# Patient Record
Sex: MALE | Race: WHITE | NOT HISPANIC OR LATINO
[De-identification: names, ages, dates, MRNs, and addresses within clinical notes are randomized per-mention and may not be internally consistent; named-entity substitution may affect disease eponyms.]

---

## 2017-06-21 ENCOUNTER — HOSPITAL (OUTPATIENT)
Dept: OTHER | Age: 57
End: 2017-06-21
Attending: INTERNAL MEDICINE

## 2017-06-22 ENCOUNTER — PRIOR ORIGINAL RECORDS (OUTPATIENT)
Dept: OTHER | Age: 57
End: 2017-06-22

## 2017-06-22 ENCOUNTER — HOSPITAL (OUTPATIENT)
Dept: OTHER | Age: 57
End: 2017-06-22
Attending: INTERNAL MEDICINE

## 2017-06-22 LAB
ALBUMIN SERPL-MCNC: 3.5 GM/DL (ref 3.6–5.1)
ALBUMIN/GLOB SERPL: 1.1 {RATIO} (ref 1–2.4)
ALP SERPL-CCNC: 47 UNIT/L (ref 45–117)
ALT SERPL-CCNC: 20 UNIT/L
ANALYZER ANC (IANC): ABNORMAL
ANION GAP SERPL CALC-SCNC: 12 MMOL/L (ref 10–20)
AST SERPL-CCNC: 14 UNIT/L
BILIRUB SERPL-MCNC: 0.5 MG/DL (ref 0.2–1)
BUN SERPL-MCNC: 16 MG/DL (ref 6–20)
BUN/CREAT SERPL: 15 (ref 7–25)
CALCIUM SERPL-MCNC: 8.9 MG/DL (ref 8.4–10.2)
CHLORIDE: 105 MMOL/L (ref 98–107)
CHOLEST SERPL-MCNC: 140 MG/DL
CHOLEST/HDLC SERPL: 2.3 {RATIO}
CO2 SERPL-SCNC: 29 MMOL/L (ref 21–32)
CREAT SERPL-MCNC: 1.04 MG/DL (ref 0.67–1.17)
ERYTHROCYTE [DISTWIDTH] IN BLOOD: 12.6 % (ref 11–15)
GLOBULIN SER-MCNC: 3.2 GM/DL (ref 2–4)
GLUCOSE SERPL-MCNC: 98 MG/DL (ref 65–99)
HDLC SERPL-MCNC: 61 MG/DL
HEMATOCRIT: 40.7 % (ref 39–51)
HGB BLD-MCNC: 14 GM/DL (ref 13–17)
LDLC SERPL CALC-MCNC: 73 MG/DL
LENGTH OF FAST TIME PATIENT: ABNORMAL HR
LENGTH OF FAST TIME PATIENT: NORMAL HR
MCH RBC QN AUTO: 31.6 PG (ref 26–34)
MCHC RBC AUTO-ENTMCNC: 34.4 GM/DL (ref 32–36.5)
MCV RBC AUTO: 91.9 FL (ref 78–100)
NONHDLC SERPL-MCNC: 79 MG/DL
PLATELET # BLD: 255 THOUSAND/MCL (ref 140–450)
POTASSIUM SERPL-SCNC: 4.4 MMOL/L (ref 3.4–5.1)
PROT SERPL-MCNC: 6.7 GM/DL (ref 6.4–8.2)
RBC # BLD: 4.43 MILLION/MCL (ref 4.5–5.9)
SODIUM SERPL-SCNC: 142 MMOL/L (ref 135–145)
TRIGLYCERIDE (TRIGP): 30 MG/DL
TSH SERPL-ACNC: 2.12 MCUNIT/ML (ref 0.35–5)
WBC # BLD: 4.3 THOUSAND/MCL (ref 4.2–11)

## 2017-06-23 ENCOUNTER — PRIOR ORIGINAL RECORDS (OUTPATIENT)
Dept: OTHER | Age: 57
End: 2017-06-23

## 2017-06-23 ENCOUNTER — MYAURORA ACCOUNT LINK (OUTPATIENT)
Dept: OTHER | Age: 57
End: 2017-06-23

## 2017-06-23 LAB
ALBUMIN: 3.5 G/DL
ALKALINE PHOSPHATATE(ALK PHOS): 47 IU/L
ALT (SGPT): 20 U/L
AST (SGOT): 14 U/L
BILIRUBIN TOTAL: 0.5 MG/DL
BUN: 16 MG/DL
CALCIUM: 8.9 MG/DL
CHLORIDE: 105 MEQ/L
CHOLESTEROL, TOTAL: 140 MG/DL
CREATININE, SERUM: 1.04 MG/DL
GLOBULIN: 3.2 G/DL
GLUCOSE: 98 MG/DL
GLUCOSE: 98 MG/DL
HDL CHOLESTEROL: 61 MG/DL
HEMATOCRIT: 40.7 %
HEMOGLOBIN: 14 G/DL
LDL CHOLESTEROL: 73 MG/DL
MCH: 31.6 PG
MCHC: 34.4 G/DL
MCV: 91.9 FL
PLATELETS: 255 K/UL
POTASSIUM, SERUM: 4.4 MEQ/L
PROTEIN, TOTAL: 6.7 G/DL
RED BLOOD COUNT: 4.43 X 10-6/U
SGOT (AST): 14 IU/L
SGPT (ALT): 20 IU/L
SODIUM: 142 MEQ/L
THYROID STIMULATING HORMONE: 2.12 MLU/L
TRIGLYCERIDES: 30 MG/DL
WHITE BLOOD COUNT: 4.3 X 10-3/U

## 2017-06-26 ENCOUNTER — PRIOR ORIGINAL RECORDS (OUTPATIENT)
Dept: OTHER | Age: 57
End: 2017-06-26

## 2017-07-14 ENCOUNTER — PRIOR ORIGINAL RECORDS (OUTPATIENT)
Dept: OTHER | Age: 57
End: 2017-07-14

## 2017-08-22 ENCOUNTER — CHARTING TRANS (OUTPATIENT)
Dept: OTHER | Age: 57
End: 2017-08-22

## 2017-08-25 ENCOUNTER — CHARTING TRANS (OUTPATIENT)
Dept: OTHER | Age: 57
End: 2017-08-25

## 2018-05-07 ENCOUNTER — IMAGING SERVICES (OUTPATIENT)
Dept: OTHER | Age: 58
End: 2018-05-07

## 2018-05-07 ENCOUNTER — HOSPITAL (OUTPATIENT)
Dept: OTHER | Age: 58
End: 2018-05-07
Attending: EMERGENCY MEDICINE

## 2018-05-16 PROBLEM — S90.30XA CONTUSION OF FOOT, UNSPECIFIED LATERALITY, INITIAL ENCOUNTER: Status: ACTIVE | Noted: 2018-05-16

## 2018-07-09 ENCOUNTER — HOSPITAL (OUTPATIENT)
Dept: OTHER | Age: 58
End: 2018-07-09
Attending: INTERNAL MEDICINE

## 2018-07-09 ENCOUNTER — PRIOR ORIGINAL RECORDS (OUTPATIENT)
Dept: OTHER | Age: 58
End: 2018-07-09

## 2018-07-09 LAB
ALBUMIN SERPL-MCNC: 3.8 GM/DL (ref 3.6–5.1)
ALBUMIN/GLOB SERPL: 1.3 {RATIO} (ref 1–2.4)
ALP SERPL-CCNC: 56 UNIT/L (ref 45–117)
ALT SERPL-CCNC: 25 UNIT/L
ANALYZER ANC (IANC): ABNORMAL
ANION GAP SERPL CALC-SCNC: 6 MMOL/L (ref 10–20)
AST SERPL-CCNC: 16 UNIT/L
BILIRUB SERPL-MCNC: 0.6 MG/DL (ref 0.2–1)
BUN SERPL-MCNC: 13 MG/DL (ref 6–20)
BUN/CREAT SERPL: 12 (ref 7–25)
CALCIUM SERPL-MCNC: 8.8 MG/DL (ref 8.4–10.2)
CHLORIDE: 103 MMOL/L (ref 98–107)
CO2 SERPL-SCNC: 32 MMOL/L (ref 21–32)
CREAT SERPL-MCNC: 1.07 MG/DL (ref 0.67–1.17)
ERYTHROCYTE [DISTWIDTH] IN BLOOD: 13.1 % (ref 11–15)
GLOBULIN SER-MCNC: 2.9 GM/DL (ref 2–4)
GLUCOSE SERPL-MCNC: 97 MG/DL (ref 65–99)
HEMATOCRIT: 40 % (ref 39–51)
HGB BLD-MCNC: 14.1 GM/DL (ref 13–17)
LENGTH OF FAST TIME PATIENT: ABNORMAL HR
MCH RBC QN AUTO: 32.6 PG (ref 26–34)
MCHC RBC AUTO-ENTMCNC: 35.3 GM/DL (ref 32–36.5)
MCV RBC AUTO: 92.4 FL (ref 78–100)
NRBC (NRBCRE): ABNORMAL
NT-PROBNP SERPL-MCNC: 62 PG/ML
PLATELET # BLD: 258 THOUSAND/MCL (ref 140–450)
POTASSIUM SERPL-SCNC: 4.4 MMOL/L (ref 3.4–5.1)
PROT SERPL-MCNC: 6.7 GM/DL (ref 6.4–8.2)
RBC # BLD: 4.33 MILLION/MCL (ref 4.5–5.9)
SODIUM SERPL-SCNC: 137 MMOL/L (ref 135–145)
WBC # BLD: 5.5 THOUSAND/MCL (ref 4.2–11)

## 2018-07-10 LAB
ALBUMIN: 3.8 G/DL
ALKALINE PHOSPHATATE(ALK PHOS): 56 IU/L
BILIRUBIN TOTAL: 0.6 MG/DL
BUN: 13 MG/DL
CALCIUM: 8.8 MG/DL
CHLORIDE: 103 MEQ/L
CREATININE, SERUM: 1.07 MG/DL
GLOBULIN: 2.9 G/DL
GLUCOSE: 97 MG/DL
HEMATOCRIT: 40 %
HEMOGLOBIN: 14.1 G/DL
MCH: 32.6 PG
MCHC: 35.3 G/DL
MCV: 92.4 FL
PLATELETS: 258 K/UL
POTASSIUM, SERUM: 4.4 MEQ/L
PROTEIN, TOTAL: 6.7 G/DL
RED BLOOD COUNT: 4.33 X 10-6/U
SGOT (AST): 16 IU/L
SGPT (ALT): 25 IU/L
SODIUM: 137 MEQ/L
WHITE BLOOD COUNT: 5.5 X 10-3/U

## 2018-07-12 ENCOUNTER — CHARTING TRANS (OUTPATIENT)
Dept: OTHER | Age: 58
End: 2018-07-12

## 2018-07-12 ASSESSMENT — PAIN SCALES - GENERAL: PAINLEVEL_OUTOF10: 8

## 2018-07-16 ENCOUNTER — PRIOR ORIGINAL RECORDS (OUTPATIENT)
Dept: OTHER | Age: 58
End: 2018-07-16

## 2018-07-20 ENCOUNTER — PRIOR ORIGINAL RECORDS (OUTPATIENT)
Dept: OTHER | Age: 58
End: 2018-07-20

## 2018-07-27 ENCOUNTER — PRIOR ORIGINAL RECORDS (OUTPATIENT)
Dept: OTHER | Age: 58
End: 2018-07-27

## 2018-07-27 ENCOUNTER — MYAURORA ACCOUNT LINK (OUTPATIENT)
Dept: OTHER | Age: 58
End: 2018-07-27

## 2018-07-31 ENCOUNTER — PRIOR ORIGINAL RECORDS (OUTPATIENT)
Dept: OTHER | Age: 58
End: 2018-07-31

## 2018-08-03 ENCOUNTER — MYAURORA ACCOUNT LINK (OUTPATIENT)
Dept: OTHER | Age: 58
End: 2018-08-03

## 2018-08-03 ENCOUNTER — PRIOR ORIGINAL RECORDS (OUTPATIENT)
Dept: OTHER | Age: 58
End: 2018-08-03

## 2018-08-06 ENCOUNTER — PRIOR ORIGINAL RECORDS (OUTPATIENT)
Dept: OTHER | Age: 58
End: 2018-08-06

## 2018-10-16 ENCOUNTER — HOSPITAL (OUTPATIENT)
Dept: OTHER | Age: 58
End: 2018-10-16
Attending: EMERGENCY MEDICINE

## 2018-10-31 VITALS
TEMPERATURE: 96.6 F | OXYGEN SATURATION: 98 % | WEIGHT: 175.04 LBS | RESPIRATION RATE: 14 BRPM | BODY MASS INDEX: 24.51 KG/M2 | HEIGHT: 71 IN | HEART RATE: 71 BPM

## 2018-11-03 VITALS
SYSTOLIC BLOOD PRESSURE: 112 MMHG | HEART RATE: 74 BPM | DIASTOLIC BLOOD PRESSURE: 88 MMHG | TEMPERATURE: 98.5 F | RESPIRATION RATE: 14 BRPM | BODY MASS INDEX: 23.8 KG/M2 | HEIGHT: 71 IN | WEIGHT: 170 LBS

## 2018-12-01 ENCOUNTER — PRIOR ORIGINAL RECORDS (OUTPATIENT)
Dept: HEALTH INFORMATION MANAGEMENT | Facility: OTHER | Age: 58
End: 2018-12-01

## 2019-01-01 ENCOUNTER — EXTERNAL RECORD (OUTPATIENT)
Dept: HEALTH INFORMATION MANAGEMENT | Facility: OTHER | Age: 59
End: 2019-01-01

## 2019-01-02 ENCOUNTER — PRIOR ORIGINAL RECORDS (OUTPATIENT)
Dept: OTHER | Age: 59
End: 2019-01-02

## 2019-01-02 ENCOUNTER — MYAURORA ACCOUNT LINK (OUTPATIENT)
Dept: OTHER | Age: 59
End: 2019-01-02

## 2019-01-29 ENCOUNTER — TELEPHONE (OUTPATIENT)
Dept: SCHEDULING | Age: 59
End: 2019-01-29

## 2019-02-28 VITALS
HEIGHT: 71 IN | RESPIRATION RATE: 20 BRPM | WEIGHT: 174 LBS | OXYGEN SATURATION: 97 % | HEART RATE: 60 BPM | DIASTOLIC BLOOD PRESSURE: 100 MMHG | SYSTOLIC BLOOD PRESSURE: 150 MMHG | BODY MASS INDEX: 24.36 KG/M2

## 2019-02-28 VITALS
DIASTOLIC BLOOD PRESSURE: 60 MMHG | HEIGHT: 71 IN | RESPIRATION RATE: 20 BRPM | BODY MASS INDEX: 24.22 KG/M2 | HEART RATE: 60 BPM | WEIGHT: 173 LBS | SYSTOLIC BLOOD PRESSURE: 98 MMHG

## 2019-02-28 VITALS
SYSTOLIC BLOOD PRESSURE: 122 MMHG | RESPIRATION RATE: 16 BRPM | HEIGHT: 71 IN | HEART RATE: 77 BPM | DIASTOLIC BLOOD PRESSURE: 86 MMHG | WEIGHT: 174 LBS | BODY MASS INDEX: 24.36 KG/M2

## 2019-02-28 VITALS
BODY MASS INDEX: 23.8 KG/M2 | HEART RATE: 72 BPM | DIASTOLIC BLOOD PRESSURE: 82 MMHG | WEIGHT: 170 LBS | HEIGHT: 71 IN | SYSTOLIC BLOOD PRESSURE: 134 MMHG

## 2019-03-30 ENCOUNTER — WALK IN (OUTPATIENT)
Dept: URGENT CARE | Age: 59
End: 2019-03-30

## 2019-03-30 DIAGNOSIS — H61.23 BILATERAL IMPACTED CERUMEN: Primary | ICD-10-CM

## 2019-03-30 PROCEDURE — 69209 REMOVE IMPACTED EAR WAX UNI: CPT | Performed by: NURSE PRACTITIONER

## 2019-03-30 PROCEDURE — 99213 OFFICE O/P EST LOW 20 MIN: CPT | Performed by: NURSE PRACTITIONER

## 2019-03-30 RX ORDER — LISINOPRIL 5 MG/1
5 TABLET ORAL
COMMUNITY
Start: 2018-08-06

## 2019-03-30 RX ORDER — METOPROLOL SUCCINATE 25 MG/1
TABLET, EXTENDED RELEASE ORAL DAILY
COMMUNITY
Start: 2018-08-08 | End: 2020-03-14 | Stop reason: SDUPTHER

## 2019-03-30 ASSESSMENT — ENCOUNTER SYMPTOMS
TROUBLE SWALLOWING: 0
DIZZINESS: 0
EYE ITCHING: 0
FEVER: 0
SHORTNESS OF BREATH: 0
SINUS PAIN: 0
CHEST TIGHTNESS: 0
PHOTOPHOBIA: 0
EYE DISCHARGE: 0
EYE PAIN: 0
SINUS PRESSURE: 0
COUGH: 0
HEADACHES: 0
FATIGUE: 0
WHEEZING: 0
CHILLS: 0
LIGHT-HEADEDNESS: 0
SORE THROAT: 0
RHINORRHEA: 0
EYE REDNESS: 0

## 2019-03-30 ASSESSMENT — PAIN SCALES - GENERAL: PAINLEVEL: 7-8

## 2019-06-07 ENCOUNTER — TELEPHONE (OUTPATIENT)
Dept: SCHEDULING | Age: 59
End: 2019-06-07

## 2019-08-30 ENCOUNTER — WALK IN (OUTPATIENT)
Dept: URGENT CARE | Age: 59
End: 2019-08-30

## 2019-08-30 DIAGNOSIS — L23.7 POISON IVY DERMATITIS: Primary | ICD-10-CM

## 2019-08-30 PROCEDURE — 99203 OFFICE O/P NEW LOW 30 MIN: CPT | Performed by: NURSE PRACTITIONER

## 2019-08-30 RX ORDER — TRIAMCINOLONE ACETONIDE 1 MG/G
OINTMENT TOPICAL 2 TIMES DAILY
Qty: 30 G | Refills: 0 | Status: SHIPPED | OUTPATIENT
Start: 2019-08-30 | End: 2020-03-14 | Stop reason: ALTCHOICE

## 2019-08-30 RX ORDER — PREDNISONE 10 MG/1
TABLET ORAL
Qty: 63 TABLET | Refills: 0 | Status: SHIPPED | OUTPATIENT
Start: 2019-08-30 | End: 2020-03-14 | Stop reason: ALTCHOICE

## 2019-08-30 RX ORDER — METOPROLOL SUCCINATE 25 MG/1
25 TABLET, EXTENDED RELEASE ORAL DAILY
COMMUNITY

## 2019-08-30 RX ORDER — LISINOPRIL 5 MG/1
5 TABLET ORAL DAILY
COMMUNITY
End: 2020-03-14 | Stop reason: SDUPTHER

## 2019-08-30 ASSESSMENT — ENCOUNTER SYMPTOMS
FATIGUE: 0
LIGHT-HEADEDNESS: 0
COUGH: 0
SHORTNESS OF BREATH: 0
WHEEZING: 0
CHEST TIGHTNESS: 0
FEVER: 0
CHILLS: 0
DIZZINESS: 0
HEADACHES: 0

## 2019-08-30 ASSESSMENT — PAIN SCALES - GENERAL: PAINLEVEL: 0

## 2020-01-01 ENCOUNTER — EXTERNAL RECORD (OUTPATIENT)
Dept: HEALTH INFORMATION MANAGEMENT | Facility: OTHER | Age: 60
End: 2020-01-01

## 2020-03-14 ENCOUNTER — WALK IN (OUTPATIENT)
Dept: URGENT CARE | Age: 60
End: 2020-03-14

## 2020-03-14 VITALS
SYSTOLIC BLOOD PRESSURE: 118 MMHG | RESPIRATION RATE: 16 BRPM | DIASTOLIC BLOOD PRESSURE: 68 MMHG | HEART RATE: 62 BPM | HEIGHT: 71 IN | WEIGHT: 165 LBS | BODY MASS INDEX: 23.1 KG/M2 | TEMPERATURE: 98.1 F

## 2020-03-14 DIAGNOSIS — H61.23 BILATERAL IMPACTED CERUMEN: Primary | ICD-10-CM

## 2020-03-14 PROCEDURE — 69210 REMOVE IMPACTED EAR WAX UNI: CPT | Performed by: NURSE PRACTITIONER

## 2020-03-14 PROCEDURE — 99213 OFFICE O/P EST LOW 20 MIN: CPT | Performed by: NURSE PRACTITIONER

## 2020-03-14 ASSESSMENT — ENCOUNTER SYMPTOMS
VOMITING: 0
DIARRHEA: 0
ABDOMINAL PAIN: 0
RHINORRHEA: 0
COUGH: 0
HEADACHES: 0
SORE THROAT: 0

## 2020-04-16 ENCOUNTER — TELEPHONE (OUTPATIENT)
Dept: SCHEDULING | Age: 60
End: 2020-04-16

## 2020-06-05 ENCOUNTER — TELEPHONE (OUTPATIENT)
Dept: SCHEDULING | Age: 60
End: 2020-06-05

## 2020-09-30 ENCOUNTER — TELEPHONE (OUTPATIENT)
Dept: SCHEDULING | Age: 60
End: 2020-09-30

## 2020-10-01 ENCOUNTER — WALK IN (OUTPATIENT)
Dept: URGENT CARE | Age: 60
End: 2020-10-01
Attending: EMERGENCY MEDICINE

## 2020-10-01 DIAGNOSIS — J32.9 BACTERIAL SINUSITIS: Primary | ICD-10-CM

## 2020-10-01 DIAGNOSIS — B96.89 BACTERIAL SINUSITIS: Primary | ICD-10-CM

## 2020-10-01 PROCEDURE — 99212 OFFICE O/P EST SF 10 MIN: CPT

## 2020-10-01 RX ORDER — SULFAMETHOXAZOLE AND TRIMETHOPRIM 800; 160 MG/1; MG/1
1 TABLET ORAL 2 TIMES DAILY
Qty: 14 TABLET | Refills: 0 | Status: SHIPPED | OUTPATIENT
Start: 2020-10-01 | End: 2020-10-08

## 2020-10-01 ASSESSMENT — ENCOUNTER SYMPTOMS
FEVER: 0
RESPIRATORY NEGATIVE: 1
CHILLS: 0
SINUS PRESSURE: 1
NEUROLOGICAL NEGATIVE: 1
GASTROINTESTINAL NEGATIVE: 1
SINUS PAIN: 1

## 2020-10-01 ASSESSMENT — PAIN SCALES - GENERAL: PAINLEVEL: 0

## 2021-01-01 ENCOUNTER — EXTERNAL RECORD (OUTPATIENT)
Dept: HEALTH INFORMATION MANAGEMENT | Facility: OTHER | Age: 61
End: 2021-01-01

## 2021-02-19 ENCOUNTER — TELEPHONE (OUTPATIENT)
Dept: SCHEDULING | Age: 61
End: 2021-02-19

## 2021-02-20 ENCOUNTER — OFFICE VISIT (OUTPATIENT)
Dept: URGENT CARE | Age: 61
End: 2021-02-20

## 2021-02-20 DIAGNOSIS — H61.21 IMPACTED CERUMEN OF RIGHT EAR: Primary | ICD-10-CM

## 2021-02-20 PROCEDURE — 99213 OFFICE O/P EST LOW 20 MIN: CPT | Performed by: NURSE PRACTITIONER

## 2021-02-20 PROCEDURE — 69210 REMOVE IMPACTED EAR WAX UNI: CPT | Performed by: NURSE PRACTITIONER

## 2021-02-20 ASSESSMENT — ENCOUNTER SYMPTOMS
CHOKING: 0
FACIAL SWELLING: 0
SINUS PAIN: 0
DIARRHEA: 0
NAUSEA: 0
DIZZINESS: 0
COLOR CHANGE: 0
EYE REDNESS: 0
VOMITING: 0
EYE PAIN: 0
FEVER: 0
SHORTNESS OF BREATH: 0
PHOTOPHOBIA: 0
ABDOMINAL PAIN: 0
WHEEZING: 0
SORE THROAT: 0
DIAPHORESIS: 0
RHINORRHEA: 0
WEAKNESS: 0
COUGH: 0
STRIDOR: 0
APPETITE CHANGE: 0
APNEA: 0
BACK PAIN: 0
TROUBLE SWALLOWING: 0
HEADACHES: 0
SINUS PRESSURE: 0
LIGHT-HEADEDNESS: 0
CHEST TIGHTNESS: 0
EYE ITCHING: 0
CHILLS: 0
ACTIVITY CHANGE: 0
FATIGUE: 0
EYE DISCHARGE: 0

## 2021-02-20 ASSESSMENT — PAIN SCALES - GENERAL: PAINLEVEL: 0

## 2021-03-23 ENCOUNTER — IMMUNIZATION (OUTPATIENT)
Dept: LAB | Age: 61
End: 2021-03-23

## 2021-03-23 DIAGNOSIS — Z23 NEED FOR VACCINATION: Primary | ICD-10-CM

## 2021-03-23 PROCEDURE — 0001A COVID 19 PFIZER-BIONTECH: CPT | Performed by: NURSE PRACTITIONER

## 2021-03-23 PROCEDURE — 91300 COVID 19 PFIZER-BIONTECH: CPT | Performed by: NURSE PRACTITIONER

## 2021-04-13 ENCOUNTER — IMMUNIZATION (OUTPATIENT)
Dept: LAB | Age: 61
End: 2021-04-13
Attending: HOSPITALIST

## 2021-04-13 DIAGNOSIS — Z23 NEED FOR VACCINATION: Primary | ICD-10-CM

## 2021-04-13 PROCEDURE — 0002A COVID 19 PFIZER-BIONTECH: CPT

## 2021-04-13 PROCEDURE — 91300 COVID 19 PFIZER-BIONTECH: CPT

## 2021-04-16 ENCOUNTER — IMMUNIZATION (OUTPATIENT)
Dept: URGENT CARE | Age: 61
End: 2021-04-16

## 2021-04-16 VITALS
SYSTOLIC BLOOD PRESSURE: 100 MMHG | DIASTOLIC BLOOD PRESSURE: 60 MMHG | HEART RATE: 66 BPM | RESPIRATION RATE: 18 BRPM | TEMPERATURE: 98.5 F

## 2021-04-16 DIAGNOSIS — H61.23 BILATERAL IMPACTED CERUMEN: ICD-10-CM

## 2021-04-16 DIAGNOSIS — H91.90 PERCEIVED HEARING CHANGES: Primary | ICD-10-CM

## 2021-04-16 PROCEDURE — 69209 REMOVE IMPACTED EAR WAX UNI: CPT | Performed by: NURSE PRACTITIONER

## 2021-04-16 PROCEDURE — 99212 OFFICE O/P EST SF 10 MIN: CPT | Performed by: NURSE PRACTITIONER

## 2021-04-16 ASSESSMENT — ENCOUNTER SYMPTOMS
FEVER: 0
DIZZINESS: 0

## 2021-05-26 VITALS
RESPIRATION RATE: 16 BRPM | WEIGHT: 170 LBS | HEART RATE: 72 BPM | HEIGHT: 71 IN | OXYGEN SATURATION: 95 % | TEMPERATURE: 98 F | BODY MASS INDEX: 24.08 KG/M2 | BODY MASS INDEX: 22.68 KG/M2 | RESPIRATION RATE: 16 BRPM | SYSTOLIC BLOOD PRESSURE: 134 MMHG | OXYGEN SATURATION: 99 % | RESPIRATION RATE: 14 BRPM | DIASTOLIC BLOOD PRESSURE: 68 MMHG | OXYGEN SATURATION: 98 % | BODY MASS INDEX: 23.8 KG/M2 | WEIGHT: 172 LBS | TEMPERATURE: 97.8 F | TEMPERATURE: 98.1 F | WEIGHT: 166 LBS | HEIGHT: 71 IN | RESPIRATION RATE: 18 BRPM | DIASTOLIC BLOOD PRESSURE: 88 MMHG | HEART RATE: 93 BPM | TEMPERATURE: 98.1 F | BODY MASS INDEX: 23.15 KG/M2 | HEART RATE: 74 BPM | SYSTOLIC BLOOD PRESSURE: 112 MMHG | HEIGHT: 71 IN | WEIGHT: 162 LBS | OXYGEN SATURATION: 99 % | HEART RATE: 59 BPM

## 2021-06-26 ENCOUNTER — WALK IN (OUTPATIENT)
Dept: URGENT CARE | Age: 61
End: 2021-06-26

## 2021-06-26 VITALS — TEMPERATURE: 98.5 F

## 2021-06-26 DIAGNOSIS — H61.23 BILATERAL IMPACTED CERUMEN: Primary | ICD-10-CM

## 2021-06-26 PROCEDURE — 69209 REMOVE IMPACTED EAR WAX UNI: CPT | Performed by: NURSE PRACTITIONER

## 2021-07-29 ENCOUNTER — OFFICE VISIT (OUTPATIENT)
Dept: URGENT CARE | Age: 61
End: 2021-07-29

## 2021-07-29 ENCOUNTER — TELEPHONE (OUTPATIENT)
Dept: SCHEDULING | Age: 61
End: 2021-07-29

## 2021-07-29 VITALS
WEIGHT: 166 LBS | OXYGEN SATURATION: 98 % | DIASTOLIC BLOOD PRESSURE: 60 MMHG | RESPIRATION RATE: 16 BRPM | TEMPERATURE: 98.1 F | HEART RATE: 68 BPM | BODY MASS INDEX: 23.77 KG/M2 | SYSTOLIC BLOOD PRESSURE: 100 MMHG | HEIGHT: 70 IN

## 2021-07-29 DIAGNOSIS — H61.893 IRRITATION OF BOTH EXTERNAL AUDITORY CANALS: ICD-10-CM

## 2021-07-29 DIAGNOSIS — H61.23 IMPACTED CERUMEN OF BOTH EARS: ICD-10-CM

## 2021-07-29 DIAGNOSIS — H92.02 OTALGIA OF LEFT EAR: Primary | ICD-10-CM

## 2021-07-29 DIAGNOSIS — H65.93 FLUID LEVEL BEHIND TYMPANIC MEMBRANE OF BOTH EARS: ICD-10-CM

## 2021-07-29 PROCEDURE — 99213 OFFICE O/P EST LOW 20 MIN: CPT | Performed by: NURSE PRACTITIONER

## 2021-07-29 PROCEDURE — 69209 REMOVE IMPACTED EAR WAX UNI: CPT | Performed by: NURSE PRACTITIONER

## 2021-07-29 ASSESSMENT — ENCOUNTER SYMPTOMS
SINUS PRESSURE: 0
SLEEP DISTURBANCE: 0
LIGHT-HEADEDNESS: 0
FACIAL SWELLING: 0
SHORTNESS OF BREATH: 0
SINUS PAIN: 0
WHEEZING: 0
CHEST TIGHTNESS: 0
CHILLS: 0
FEVER: 0
EYE REDNESS: 0
SORE THROAT: 0
COUGH: 0
EYE ITCHING: 0
DIARRHEA: 0
CONSTIPATION: 0
NAUSEA: 0
HEADACHES: 0
FATIGUE: 0
RESPIRATORY NEGATIVE: 1
TROUBLE SWALLOWING: 0
VOMITING: 0
RHINORRHEA: 0
DIZZINESS: 0
APPETITE CHANGE: 0
ACTIVITY CHANGE: 0
ABDOMINAL PAIN: 0

## 2022-08-06 ENCOUNTER — TELEPHONE (OUTPATIENT)
Dept: SCHEDULING | Age: 62
End: 2022-08-06

## 2022-08-06 ENCOUNTER — WALK IN (OUTPATIENT)
Dept: URGENT CARE | Age: 62
End: 2022-08-06
Attending: FAMILY MEDICINE

## 2022-08-06 VITALS
DIASTOLIC BLOOD PRESSURE: 98 MMHG | HEART RATE: 70 BPM | TEMPERATURE: 97.2 F | RESPIRATION RATE: 14 BRPM | SYSTOLIC BLOOD PRESSURE: 147 MMHG | OXYGEN SATURATION: 99 % | WEIGHT: 164 LBS | BODY MASS INDEX: 23.53 KG/M2

## 2022-08-06 DIAGNOSIS — J34.89 SINUS PAIN: ICD-10-CM

## 2022-08-06 DIAGNOSIS — R09.81 NASAL CONGESTION: Primary | ICD-10-CM

## 2022-08-06 LAB
FLUAV RNA RESP QL NAA+PROBE: NOT DETECTED
FLUBV RNA RESP QL NAA+PROBE: NOT DETECTED
RSV AG NPH QL IA.RAPID: NOT DETECTED
SARS-COV-2 RNA RESP QL NAA+PROBE: NOT DETECTED

## 2022-08-06 PROCEDURE — 99212 OFFICE O/P EST SF 10 MIN: CPT

## 2022-08-06 PROCEDURE — C9803 HOPD COVID-19 SPEC COLLECT: HCPCS

## 2022-08-06 PROCEDURE — 0241U POCT COVID/FLU/RSV PANEL: CPT | Performed by: FAMILY MEDICINE

## 2022-08-06 RX ORDER — CEFDINIR 300 MG/1
300 CAPSULE ORAL 2 TIMES DAILY
Qty: 20 CAPSULE | Refills: 0 | Status: SHIPPED | OUTPATIENT
Start: 2022-08-06 | End: 2022-08-16

## 2022-08-06 ASSESSMENT — ENCOUNTER SYMPTOMS
SORE THROAT: 0
AGITATION: 0
SINUS PAIN: 1
SINUS PRESSURE: 1
COUGH: 0
DIARRHEA: 0
ABDOMINAL PAIN: 0
SHORTNESS OF BREATH: 0
VOMITING: 0
FEVER: 0
NAUSEA: 0
WEAKNESS: 0
EYE REDNESS: 0
ADENOPATHY: 0
DIZZINESS: 0

## 2022-08-06 ASSESSMENT — PAIN SCALES - GENERAL
PAINLEVEL_OUTOF10: 8
PAINLEVEL: 8

## 2022-10-23 ENCOUNTER — WALK IN (OUTPATIENT)
Dept: URGENT CARE | Age: 62
End: 2022-10-23
Attending: EMERGENCY MEDICINE

## 2022-10-23 VITALS
DIASTOLIC BLOOD PRESSURE: 87 MMHG | WEIGHT: 164 LBS | HEART RATE: 64 BPM | SYSTOLIC BLOOD PRESSURE: 126 MMHG | RESPIRATION RATE: 18 BRPM | TEMPERATURE: 97.7 F | BODY MASS INDEX: 23.53 KG/M2 | OXYGEN SATURATION: 97 %

## 2022-10-23 DIAGNOSIS — J01.91 ACUTE RECURRENT SINUSITIS, UNSPECIFIED LOCATION: Primary | ICD-10-CM

## 2022-10-23 DIAGNOSIS — H66.93 BILATERAL ACUTE OTITIS MEDIA: ICD-10-CM

## 2022-10-23 PROCEDURE — 99212 OFFICE O/P EST SF 10 MIN: CPT

## 2022-10-23 PROCEDURE — C9803 HOPD COVID-19 SPEC COLLECT: HCPCS

## 2022-10-23 RX ORDER — CEFDINIR 300 MG/1
300 CAPSULE ORAL 2 TIMES DAILY
Qty: 20 CAPSULE | Refills: 0 | Status: SHIPPED | OUTPATIENT
Start: 2022-10-23 | End: 2022-11-02

## 2022-10-23 ASSESSMENT — PAIN SCALES - GENERAL
PAINLEVEL: 9
PAINLEVEL_OUTOF10: 5

## 2023-09-15 ENCOUNTER — APPOINTMENT (OUTPATIENT)
Dept: URGENT CARE | Age: 63
End: 2023-09-15
Attending: FAMILY MEDICINE

## 2025-03-05 ENCOUNTER — OFFICE VISIT (OUTPATIENT)
Dept: FAMILY MEDICINE CLINIC | Facility: CLINIC | Age: 65
End: 2025-03-05
Payer: COMMERCIAL

## 2025-03-05 VITALS
OXYGEN SATURATION: 96 % | BODY MASS INDEX: 24.77 KG/M2 | RESPIRATION RATE: 18 BRPM | SYSTOLIC BLOOD PRESSURE: 118 MMHG | DIASTOLIC BLOOD PRESSURE: 78 MMHG | WEIGHT: 173 LBS | TEMPERATURE: 98 F | HEIGHT: 70 IN | HEART RATE: 79 BPM

## 2025-03-05 DIAGNOSIS — J01.90 ACUTE RHINOSINUSITIS: Primary | ICD-10-CM

## 2025-03-05 PROCEDURE — 3008F BODY MASS INDEX DOCD: CPT | Performed by: NURSE PRACTITIONER

## 2025-03-05 PROCEDURE — 3074F SYST BP LT 130 MM HG: CPT | Performed by: NURSE PRACTITIONER

## 2025-03-05 PROCEDURE — 99202 OFFICE O/P NEW SF 15 MIN: CPT | Performed by: NURSE PRACTITIONER

## 2025-03-05 PROCEDURE — 3078F DIAST BP <80 MM HG: CPT | Performed by: NURSE PRACTITIONER

## 2025-03-05 RX ORDER — FLUTICASONE PROPIONATE 50 MCG
2 SPRAY, SUSPENSION (ML) NASAL DAILY
Qty: 1 EACH | Refills: 0 | Status: SHIPPED | OUTPATIENT
Start: 2025-03-05 | End: 2025-03-19

## 2025-03-05 RX ORDER — CEFDINIR 300 MG/1
300 CAPSULE ORAL 2 TIMES DAILY
Qty: 20 CAPSULE | Refills: 0 | Status: SHIPPED | OUTPATIENT
Start: 2025-03-05 | End: 2025-03-15

## 2025-03-05 NOTE — PATIENT INSTRUCTIONS
-Cefdinir as prescribed.  Take vitamins mid day    -Flonase as prescribed   -Push fluids and plenty of rest    -OTC cough medicine such as Mucinex DM or Robitussin DM (guaifenesin and dextromethorphan) as packet insert for dry and congested cough.    -OTC Tylenol/Ibuprofen as packet insert If no allergies  -Good handwashing, to prevent spread of virus    -Face mask helps prevent viral infections    Follow up in 3-5 days for worsening symptoms with clinic or PCP

## 2025-03-05 NOTE — PROGRESS NOTES
CHIEF COMPLAINT:     Chief Complaint   Patient presents with    Sinus Problem     Sinus pressure and headache, symptoms are worsening.          HPI:   Gustavo Morgan is a 64 year old male who presents for upper respiratory symptoms for  8 days. Patient reports  sinus pain .  Associated symptoms include sinus pressure, sinus congestion, headache, post nasal drip.  Denies fever, chills, GI symptoms.   Symptoms have been increasing over the last 3 days.  Treating symptoms with nothing.     No COVID exposure  History of sinus surgery and last sinus infection about 1 year ago.      Current Outpatient Medications   Medication Sig Dispense Refill    cefdinir 300 MG Oral Cap Take 1 capsule (300 mg total) by mouth 2 (two) times daily for 10 days. 20 capsule 0    fluticasone propionate 50 MCG/ACT Nasal Suspension 2 sprays by Each Nare route daily for 14 days. 1 each 0    lisinopril (PRINIVIL,ZESTRIL) 5 MG Oral Tab Take 1 tablet (5 mg total) by mouth daily.      metoprolol Tartrate (LOPRESSOR) 25 MG Oral Tab Take 1 tablet (25 mg total) by mouth 2 (two) times daily.        Past Medical History:    Prostate cancer (HCC)    [facility], radical prostatectomy    Unspecified essential hypertension      Past Surgical History:   Procedure Laterality Date    Cholecystectomy      Other surgical history      prostate    Other surgical history      kidney stone    Sinus surgery             Social History     Socioeconomic History    Marital status:    Tobacco Use    Smoking status: Never    Smokeless tobacco: Never   Substance and Sexual Activity    Alcohol use: Yes     Comment: social    Drug use: No     Social Drivers of Health      Received from [facility]         REVIEW OF SYSTEMS:   GENERAL: feels well otherwise,   good appetite  SKIN: no rashes or abnormal skin lesions  HEENT: See HPI  LUNGS: denies shortness of breath or wheezing, See HPI  CARDIOVASCULAR: denies chest pain or palpitations   GI: denies N/V/C or  abdominal pain  NEURO: sinus headaches    EXAM:   /78   Pulse 79   Temp 97.9 °F (36.6 °C)   Resp 18   Ht 5' 10\" (1.778 m)   Wt 173 lb (78.5 kg)   SpO2 96%   BMI 24.82 kg/m²   GENERAL: well developed, well nourished, in no apparent distress  SKIN: no rashes,no suspicious lesions  HEAD: atraumatic, normocephalic.  + tenderness on palpation of maxillary sinuses  EYES: conjunctiva clear  EARS: TM's clear, no bulging, no retraction, no fluid, bony landmarks visualized  NOSE: Nostrils patent, thick nasal discharge, nasal mucosa redden and mildly swollen.    THROAT: Oral mucosa pink, moist. Posterior pharynx is not erythematous. + post nasal drip.  Tonsils 1/4.    NECK: Supple, non-tender  LUNGS: clear to auscultation bilaterally; good air movement.  Breathing is non labored.  CARDIO: RRR without murmur  EXTREMITIES: no cyanosis, clubbing or edema  LYMPH:  No cervical lymphadenopathy.        ASSESSMENT AND PLAN:   Gustavo Morgan is a 64 year old male who presents with     ASSESSMENT:   Encounter Diagnosis   Name Primary?    Acute rhinosinusitis Yes       PLAN:   Denies Covid/flu testing.   He leaving for [place] tomorrow and very concerned since he will be on a plane.   Patient has a penicillin allergy, but does well with Cefdinir     Meds as listed below.  Tylenol/Motrin prn pain.    Risks, benefits, and side effects of medication explained and discussed.  Comfort measures as described in Patient Instructions.    Discussed S&S that require follow-up.           Meds & Refills for this Visit:  Requested Prescriptions     Signed Prescriptions Disp Refills    cefdinir 300 MG Oral Cap 20 capsule 0     Sig: Take 1 capsule (300 mg total) by mouth 2 (two) times daily for 10 days.    fluticasone propionate 50 MCG/ACT Nasal Suspension 1 each 0     Si sprays by Each Nare route daily for 14 days.           Patient Instructions   -Cefdinir as prescribed.  Take vitamins mid day    -Flonase as prescribed   -Push fluids  and plenty of rest    -OTC cough medicine such as Mucinex DM or Robitussin DM (guaifenesin and dextromethorphan) as packet insert for dry and congested cough.    -OTC Tylenol/Ibuprofen as packet insert If no allergies  -Good handwashing, to prevent spread of virus    -Face mask helps prevent viral infections    Follow up in 3-5 days for worsening symptoms with clinic or PCP       The patient indicates understanding of these issues and agrees to the plan.

## 2025-06-07 ENCOUNTER — WALK IN (OUTPATIENT)
Dept: URGENT CARE | Age: 65
End: 2025-06-07
Attending: EMERGENCY MEDICINE

## 2025-06-07 VITALS
SYSTOLIC BLOOD PRESSURE: 148 MMHG | DIASTOLIC BLOOD PRESSURE: 89 MMHG | OXYGEN SATURATION: 98 % | HEART RATE: 89 BPM | RESPIRATION RATE: 16 BRPM | TEMPERATURE: 99 F

## 2025-06-07 DIAGNOSIS — L23.7 POISON IVY DERMATITIS: Primary | ICD-10-CM

## 2025-06-07 RX ORDER — PREDNISONE 10 MG/1
TABLET ORAL
Qty: 42 TABLET | Refills: 0 | Status: SHIPPED | OUTPATIENT
Start: 2025-06-07